# Patient Record
Sex: MALE | Race: WHITE | Employment: OTHER | URBAN - METROPOLITAN AREA
[De-identification: names, ages, dates, MRNs, and addresses within clinical notes are randomized per-mention and may not be internally consistent; named-entity substitution may affect disease eponyms.]

---

## 2018-10-21 ENCOUNTER — TRANSFERRED RECORDS (OUTPATIENT)
Dept: HEALTH INFORMATION MANAGEMENT | Facility: CLINIC | Age: 63
End: 2018-10-21

## 2018-11-09 ENCOUNTER — TRANSFERRED RECORDS (OUTPATIENT)
Dept: HEALTH INFORMATION MANAGEMENT | Facility: CLINIC | Age: 63
End: 2018-11-09

## 2018-11-12 ENCOUNTER — TRANSFERRED RECORDS (OUTPATIENT)
Dept: HEALTH INFORMATION MANAGEMENT | Facility: CLINIC | Age: 63
End: 2018-11-12

## 2018-11-27 ENCOUNTER — TRANSFERRED RECORDS (OUTPATIENT)
Dept: HEALTH INFORMATION MANAGEMENT | Facility: CLINIC | Age: 63
End: 2018-11-27

## 2019-01-09 ENCOUNTER — TRANSFERRED RECORDS (OUTPATIENT)
Dept: HEALTH INFORMATION MANAGEMENT | Facility: CLINIC | Age: 64
End: 2019-01-09

## 2019-07-23 ENCOUNTER — TRANSFERRED RECORDS (OUTPATIENT)
Dept: HEALTH INFORMATION MANAGEMENT | Facility: CLINIC | Age: 64
End: 2019-07-23

## 2019-10-11 ENCOUNTER — OFFICE VISIT (OUTPATIENT)
Dept: NEUROLOGY | Facility: CLINIC | Age: 64
End: 2019-10-11
Payer: COMMERCIAL

## 2019-10-11 VITALS
BODY MASS INDEX: 24.2 KG/M2 | HEART RATE: 82 BPM | SYSTOLIC BLOOD PRESSURE: 123 MMHG | WEIGHT: 169 LBS | HEIGHT: 70 IN | DIASTOLIC BLOOD PRESSURE: 71 MMHG | OXYGEN SATURATION: 96 %

## 2019-10-11 DIAGNOSIS — G56.22 LESION OF LEFT ULNAR NERVE: Primary | ICD-10-CM

## 2019-10-11 PROCEDURE — 99215 OFFICE O/P EST HI 40 MIN: CPT | Performed by: NEUROLOGICAL SURGERY

## 2019-10-11 RX ORDER — IBUPROFEN 200 MG
400 TABLET ORAL DAILY PRN
COMMUNITY

## 2019-10-11 RX ORDER — SODIUM PHOSPHATE,MONO-DIBASIC 19G-7G/118
2 ENEMA (ML) RECTAL DAILY
COMMUNITY

## 2019-10-11 ASSESSMENT — MIFFLIN-ST. JEOR: SCORE: 1562.83

## 2019-10-11 ASSESSMENT — PAIN SCALES - GENERAL: PAINLEVEL: MODERATE PAIN (5)

## 2019-10-11 NOTE — PROGRESS NOTES
This clinic visit his one-year follow-up for a right ulnar nerve neural lysis and transposition performed in October 2018 for evidence of severe and progressive ulnar neuropathy.  At the time of the procedure, Mr. Sabillon had evidence of wasting of his interosseous and hyperthenar musculature.  He was also found to have significant weakness of the hand intrinsic muscles including abductor digiti quinti and flexors of the distal phalanges of the last 2 fingers    Surgery was uncomplicated and Mr. Sabillon appears to have made a significant neurologic recovery.  His wound is well-healed.  There is no Tinel sign present at the elbow.  He has good  strength and intact intrinsics in his right hand.  Abductor digiti quinti appears to have returned to almost normal.  The atrophy noted in his first dorsal interosseous muscle and hyperthenar eminence have not changed significantly.    I addressed a number of other somatic complaints today with Mr. Sabillon.  At the time of evaluation in last year he was he underwent a cervical MRI scan because of concern that he might suffer from a cervical radiculopathy.  This did not show significant cervical pathology likely to account for radiculopathy.  He has some residual complaints of intermittent pain in the right side of his neck.  He states that this was exacerbated by an exercise program recently squat thrusts likely with cervical hyperextension as a component.  This appears to have essentially resolved since he discontinued the specific exercises.  There is presently no compelling evidence by history or on examination of cervical radiculopathy.    He is complaining of pain in his right inguinal region as well as pain over the region of the right greater trochanter.  He reports that plain radiographs of his hip were not noted to be abnormal.  However, on examination he has a positive German sign to the right.  He has tenderness with palpation over the region of the greater  trochanteric bursa.  On examination of his lower extremities there is no evidence to suggest lumbar radiculopathy.  Straight leg raising is negative bilaterally.  There are no dermatomal sensory disturbances noted and I do not believe therefore that this problem on the right side likely represents lumbar radiculopathy.  I have encouraged him to follow-up with his primary care provider regarding the possibility of right greater trochanteric bursitis.    At the time of evaluation last fall, Mr. 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  At that time he had undergone an EMG and it appeared that he was showing evidence of neurologic improvement.  That improvement has continued and on testing today he has very good strength with dorsiflexion of the left ankle even when weightbearing.  He complains of persistent numbness into the middle toes of his left foot.  However, this is less than 1 year since the problem was manifested and I believe that it is possible that this will also show evidence of resolution or partial resolution in the coming year.  Given his significant motor recovery and relatively minimal symptoms presently I would not recommend a repeat EMG or any additional diagnostic or therapeutic measures directed in his left peroneal nerve.    I have asked Mr. Lopez to call or return to the office in any time should these problems changed significantly in severity or character or should he develop compelling evidence of either cervical or lumbar radiculopathy.    It should be noted that at the time of initial presentation we were concerned that all of these different and significant peripheral nerve complaints were somehow related to unifying hypothesis. Neurological evaluation and subsequent EMG findings no overarching diagnosis was determined.    Approximately 45 minutes was spent in direct contact with the patient and his wife the  majority reviewing his list of peripheral neuropathic problems, completing a neurological examination and discussing findings, diagnostic possibilities and management alternatives for these various problems with him..

## 2019-10-11 NOTE — LETTER
10/11/2019         RE: Dl Gallegos  [address] [place] [address]        Dear Colleague,    Thank you for referring your patient, Dl Gallegos, to the [facility], [place]. Please see a copy of my visit note below.    This clinic visit his one-year follow-up for a right ulnar nerve neural lysis and transposition performed in October 2018 for evidence of severe and progressive ulnar neuropathy.  At the time of the procedure, Mr. Sabillon had evidence of wasting of his interosseous and hyperthenar musculature.  He was also found to have significant weakness of the hand intrinsic muscles including abductor digiti quinti and flexors of the distal phalanges of the last 2 fingers    Surgery was uncomplicated and Mr. Sabillon appears to have made a significant neurologic recovery.  His wound is well-healed.  There is no Tinel sign present at the elbow.  He has good  strength and intact intrinsics in his right hand.  Abductor digiti quinti appears to have returned to almost normal.  The atrophy noted in his first dorsal interosseous muscle and hyperthenar eminence have not changed significantly.    I addressed a number of other somatic complaints today with Mr. Sabillon.  At the time of evaluation in last year he was he underwent a cervical MRI scan because of concern that he might suffer from a cervical radiculopathy.  This did not show significant cervical pathology likely to account for radiculopathy.  He has some residual complaints of intermittent pain in the right side of his neck.  He states that this was exacerbated by an exercise program recently squat thrusts likely with cervical hyperextension as a component.  This appears to have essentially resolved since he discontinued the specific exercises.  There is presently no compelling evidence by history or on examination of cervical radiculopathy.    He is complaining of pain in his right inguinal region as well as pain over the  region of the right greater trochanter.  He reports that plain radiographs of his hip were not noted to be abnormal.  However, on examination he has a positive German sign to the right.  He has tenderness with palpation over the region of the greater trochanteric bursa.  On examination of his lower extremities there is no evidence to suggest lumbar radiculopathy.  Straight leg raising is negative bilaterally.  There are no dermatomal sensory disturbances noted and I do not believe therefore that this problem on the right side likely represents lumbar radiculopathy.  I have encouraged him to follow-up with his primary care provider regarding the possibility of right greater trochanteric bursitis.    At the time of evaluation last fall, Mr. 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  At that time he had undergone an EMG and it appeared that he was showing evidence of neurologic improvement.  That improvement has continued and on testing today he has very good strength with dorsiflexion of the left ankle even when weightbearing.  He complains of persistent numbness into the middle toes of his left foot.  However, this is less than 1 year since the problem was manifested and I believe that it is possible that this will also show evidence of resolution or partial resolution in the coming year.  Given his significant motor recovery and relatively minimal symptoms presently I would not recommend a repeat EMG or any additional diagnostic or therapeutic measures directed in his left peroneal nerve.    I have asked Mr. Lopez to call or return to the office in any time should these problems changed significantly in severity or character or should he develop compelling evidence of either cervical or lumbar radiculopathy.    It should be noted that at the time of initial presentation we were concerned that all of these different and significant peripheral  nerve complaints were somehow related to unifying hypothesis. Neurological evaluation and subsequent EMG findings no overarching diagnosis was determined.    Approximately 45 minutes was spent in direct contact with the patient and his wife the majority reviewing his list of peripheral neuropathic problems, completing a neurological examination and discussing findings, diagnostic possibilities and management alternatives for these various problems with him..    Again, thank you for allowing me to participate in the care of your patient.        Sincerely,        Mina Pastor MD

## 2019-10-11 NOTE — NURSING NOTE
"Dl Gallegos's goals for this visit include: return  He requests these members of his care team be copied on today's visit information:     PCP: Fermin Wilcox    Referring Provider:  No referring provider defined for this encounter.    /71   Pulse 82   Ht 1.778 m (5' 10\")   Wt 76.7 kg (169 lb)   SpO2 96%   BMI 24.25 kg/m      Do you need any medication refills at today's visit? n  "